# Patient Record
(demographics unavailable — no encounter records)

---

## 2025-06-28 NOTE — HISTORY OF PRESENT ILLNESS
[N] : Patient denies prior pregnancies [Menarche Age: ____] : age at menarche was [unfilled] [No] : Patient does not have concerns regarding sex [Currently Active] : currently active [Men] : men [TextBox_4] : Ewa is here for c/o left breast burning pain and a possible lump x 1 month. pain started randomly while sitting on the couch a month ago, it has been near constant, but sometimes radiates to the lower part of the left breast.  The pain is not associated with her menstrual cycle.  She denies trauma to the breast. [PapSmeardate] : 5/1/2024 [TextBox_31] : NEG [HIVDate] : 10/4/2021 [TextBox_53] : NEG [SyphilisDate] : 10/4/2021 [TextBox_58] : NEG [LMPDate] : 6/8/2025 [PGHxTotal] : 0 [FreeTextEntry1] : 6/8/2025

## 2025-06-28 NOTE — PLAN
[FreeTextEntry1] : Pt was recommended to have a diagnostic mammogram and sonogram to further evaluate the pain and lump of the left breast.  We discussed potential outcomes of imaging including benign findings, findings which require close interval follow up, suspicious findings which require biopsy, and also the possibility the imaging will not identify the nodule.  We discussed the need for breast surgeon follow up pending results of the imaging.  I would like her to follow up in 1 mo for a repeat breast check, especially if imaging is negative.

## 2025-06-28 NOTE — PHYSICAL EXAM
[FreeTextEntry6] : left breast pain/tenderness near 12 oclock, radiating to 9 oclock.  lumpy tissue noted near 9 oclock, no distinct mass. [Examination Of The Breasts] : a normal appearance [Normal] : normal